# Patient Record
Sex: FEMALE | Race: BLACK OR AFRICAN AMERICAN | NOT HISPANIC OR LATINO
[De-identification: names, ages, dates, MRNs, and addresses within clinical notes are randomized per-mention and may not be internally consistent; named-entity substitution may affect disease eponyms.]

---

## 2017-01-23 ENCOUNTER — APPOINTMENT (OUTPATIENT)
Dept: OTOLARYNGOLOGY | Facility: CLINIC | Age: 42
End: 2017-01-23

## 2017-01-23 VITALS
HEIGHT: 55 IN | DIASTOLIC BLOOD PRESSURE: 79 MMHG | HEART RATE: 79 BPM | BODY MASS INDEX: 60.17 KG/M2 | WEIGHT: 260 LBS | SYSTOLIC BLOOD PRESSURE: 112 MMHG

## 2017-04-17 ENCOUNTER — APPOINTMENT (OUTPATIENT)
Dept: OTOLARYNGOLOGY | Facility: CLINIC | Age: 42
End: 2017-04-17

## 2017-04-17 VITALS
HEIGHT: 68 IN | BODY MASS INDEX: 38.95 KG/M2 | WEIGHT: 257 LBS | SYSTOLIC BLOOD PRESSURE: 116 MMHG | HEART RATE: 83 BPM | DIASTOLIC BLOOD PRESSURE: 83 MMHG

## 2017-04-17 DIAGNOSIS — J32.0 CHRONIC MAXILLARY SINUSITIS: ICD-10-CM

## 2017-04-18 ENCOUNTER — OUTPATIENT (OUTPATIENT)
Dept: OUTPATIENT SERVICES | Facility: HOSPITAL | Age: 42
LOS: 1 days | End: 2017-04-18
Payer: COMMERCIAL

## 2017-04-18 DIAGNOSIS — Z98.89 OTHER SPECIFIED POSTPROCEDURAL STATES: Chronic | ICD-10-CM

## 2017-04-18 PROCEDURE — 70486 CT MAXILLOFACIAL W/O DYE: CPT | Mod: 26

## 2017-04-18 PROCEDURE — 70486 CT MAXILLOFACIAL W/O DYE: CPT

## 2017-06-05 ENCOUNTER — APPOINTMENT (OUTPATIENT)
Dept: OTOLARYNGOLOGY | Facility: CLINIC | Age: 42
End: 2017-06-05

## 2017-06-26 ENCOUNTER — APPOINTMENT (OUTPATIENT)
Dept: OTOLARYNGOLOGY | Facility: CLINIC | Age: 42
End: 2017-06-26

## 2017-07-19 ENCOUNTER — APPOINTMENT (OUTPATIENT)
Dept: OTOLARYNGOLOGY | Facility: CLINIC | Age: 42
End: 2017-07-19

## 2017-07-19 RX ORDER — MOMETASONE 50 UG/1
50 SPRAY, METERED NASAL
Qty: 17 | Refills: 0 | Status: ACTIVE | COMMUNITY
Start: 2016-11-08

## 2017-08-31 VITALS
SYSTOLIC BLOOD PRESSURE: 121 MMHG | DIASTOLIC BLOOD PRESSURE: 67 MMHG | HEIGHT: 68 IN | RESPIRATION RATE: 16 BRPM | HEART RATE: 66 BPM | OXYGEN SATURATION: 100 % | WEIGHT: 260.59 LBS | TEMPERATURE: 97 F

## 2017-09-01 ENCOUNTER — INPATIENT (INPATIENT)
Facility: HOSPITAL | Age: 42
LOS: 0 days | Discharge: HOME | DRG: 132 | End: 2017-09-02
Attending: SPECIALIST | Admitting: SPECIALIST
Payer: COMMERCIAL

## 2017-09-01 ENCOUNTER — APPOINTMENT (OUTPATIENT)
Dept: OTOLARYNGOLOGY | Facility: HOSPITAL | Age: 42
End: 2017-09-01

## 2017-09-01 ENCOUNTER — RESULT REVIEW (OUTPATIENT)
Age: 42
End: 2017-09-01

## 2017-09-01 DIAGNOSIS — Z98.890 OTHER SPECIFIED POSTPROCEDURAL STATES: Chronic | ICD-10-CM

## 2017-09-01 DIAGNOSIS — Z98.89 OTHER SPECIFIED POSTPROCEDURAL STATES: Chronic | ICD-10-CM

## 2017-09-01 PROCEDURE — 68720 CREATE TEAR SAC DRAIN: CPT | Mod: RT

## 2017-09-01 PROCEDURE — 31225 REMOVAL OF UPPER JAW: CPT | Mod: RT

## 2017-09-01 RX ORDER — ACETAMINOPHEN 500 MG
650 TABLET ORAL EVERY 6 HOURS
Qty: 0 | Refills: 0 | Status: DISCONTINUED | OUTPATIENT
Start: 2017-09-01 | End: 2017-09-02

## 2017-09-01 RX ORDER — OXYCODONE AND ACETAMINOPHEN 5; 325 MG/1; MG/1
1 TABLET ORAL EVERY 4 HOURS
Qty: 0 | Refills: 0 | Status: DISCONTINUED | OUTPATIENT
Start: 2017-09-01 | End: 2017-09-02

## 2017-09-01 RX ORDER — CEFAZOLIN SODIUM 1 G
1000 VIAL (EA) INJECTION EVERY 8 HOURS
Qty: 0 | Refills: 0 | Status: DISCONTINUED | OUTPATIENT
Start: 2017-09-01 | End: 2017-09-02

## 2017-09-01 RX ORDER — ONDANSETRON 8 MG/1
4 TABLET, FILM COATED ORAL EVERY 4 HOURS
Qty: 0 | Refills: 0 | Status: DISCONTINUED | OUTPATIENT
Start: 2017-09-01 | End: 2017-09-02

## 2017-09-01 RX ORDER — SODIUM CHLORIDE 9 MG/ML
1000 INJECTION, SOLUTION INTRAVENOUS
Qty: 0 | Refills: 0 | Status: DISCONTINUED | OUTPATIENT
Start: 2017-09-01 | End: 2017-09-02

## 2017-09-01 RX ORDER — MORPHINE SULFATE 50 MG/1
4 CAPSULE, EXTENDED RELEASE ORAL
Qty: 0 | Refills: 0 | Status: DISCONTINUED | OUTPATIENT
Start: 2017-09-01 | End: 2017-09-02

## 2017-09-01 RX ORDER — METOCLOPRAMIDE HCL 10 MG
5 TABLET ORAL EVERY 8 HOURS
Qty: 0 | Refills: 0 | Status: DISCONTINUED | OUTPATIENT
Start: 2017-09-01 | End: 2017-09-02

## 2017-09-01 RX ADMIN — SODIUM CHLORIDE 100 MILLILITER(S): 9 INJECTION, SOLUTION INTRAVENOUS at 13:34

## 2017-09-01 RX ADMIN — Medication 100 MILLIGRAM(S): at 21:47

## 2017-09-01 NOTE — H&P ADULT - HISTORY OF PRESENT ILLNESS
Kelley Talamantes is a 41  year old female with a history of inverted papilloma s/p combined transnasal endoscopic and sublabial approach to right medial maxillectomy for inverted papilloma in 2016. Pt presented with obvious recurrent inverted papilloma in 4/2017.   On exam, there was evidence of obvious recurrent inverted papilloma on the anterior aspect of the right lateral nasal wall. There was no evidence of acute infection. The maxillary sinus ostium is open. On the left side, there is mild congestion with no infection or polyps. The rest of the complete and comprehensive examination of the head, neck, mouth, ears, and throat was unremarkable. There is no evidence of epiphora.   A repeat CT sinus from 4/2017 showed recurrence of inverted papilloma along the anterior maxillary antrum with residual central osseous strut. The base of this strut is close to the infraorbital canal. There is no orbital invasion. The patient underwent removal of the recurrent mass via a combined endoscopic and sublabial revision medial maxillectomy.

## 2017-09-01 NOTE — BRIEF OPERATIVE NOTE - PROCEDURE
Medial maxillectomy  09/01/2017  Right medial maxillectomy, endoscopic and sublabial approach  Active  [id number]

## 2017-09-01 NOTE — H&P ADULT - ASSESSMENT
41F s/p medial maxillectomy, endoscopic and sublabial approach. Doing well post-op.   []Will plan to d/c packing tomorrow and d/c home after observation.   []Will be on abx while inpatient.

## 2017-09-02 ENCOUNTER — TRANSCRIPTION ENCOUNTER (OUTPATIENT)
Age: 42
End: 2017-09-02

## 2017-09-02 VITALS
HEART RATE: 74 BPM | RESPIRATION RATE: 16 BRPM | TEMPERATURE: 98 F | DIASTOLIC BLOOD PRESSURE: 72 MMHG | OXYGEN SATURATION: 98 % | SYSTOLIC BLOOD PRESSURE: 104 MMHG

## 2017-09-02 LAB
GRAM STN FLD: SIGNIFICANT CHANGE UP
SPECIMEN SOURCE: SIGNIFICANT CHANGE UP

## 2017-09-02 RX ORDER — SODIUM CHLORIDE 0.65 %
1 AEROSOL, SPRAY (ML) NASAL
Qty: 5 | Refills: 0
Start: 2017-09-02 | End: 2017-09-09

## 2017-09-02 RX ORDER — ACETAMINOPHEN WITH CODEINE 300MG-30MG
1 TABLET ORAL EVERY 4 HOURS
Qty: 0 | Refills: 0 | Status: DISCONTINUED | OUTPATIENT
Start: 2017-09-02 | End: 2017-09-02

## 2017-09-02 RX ORDER — PREDNISOLONE SODIUM PHOSPHATE 1 %
1 DROPS OPHTHALMIC (EYE)
Qty: 1 | Refills: 0 | OUTPATIENT
Start: 2017-09-02 | End: 2017-09-07

## 2017-09-02 RX ORDER — ACETAMINOPHEN WITH CODEINE 300MG-30MG
1 TABLET ORAL
Qty: 0 | Refills: 0 | DISCHARGE
Start: 2017-09-02

## 2017-09-02 RX ORDER — PREDNISOLONE SODIUM PHOSPHATE 1 %
2 DROPS OPHTHALMIC (EYE)
Qty: 1 | Refills: 0
Start: 2017-09-02 | End: 2017-09-07

## 2017-09-02 RX ORDER — ACETAMINOPHEN WITH CODEINE 300MG-30MG
1 TABLET ORAL
Qty: 18 | Refills: 0 | OUTPATIENT
Start: 2017-09-02 | End: 2017-09-05

## 2017-09-02 RX ADMIN — Medication 100 MILLIGRAM(S): at 06:12

## 2017-09-02 NOTE — DISCHARGE NOTE ADULT - HOSPITAL COURSE
41F s/p medial maxillectomy, endoscopic and sublabial approach. Doing well post-op.     9/2 - well overnight, tolerating PO, pain controlled. packing to be removed today      Vital Signs Last 24 Hrs  T(C): 36.4 (02 Sep 2017 12:33), Max: 36.8 (02 Sep 2017 00:08)  T(F): 97.6 (02 Sep 2017 12:33), Max: 98.3 (02 Sep 2017 06:02)  HR: 74 (02 Sep 2017 12:33) (74 - 90)  BP: 104/72 (02 Sep 2017 12:33) (104/72 - 139/76)  BP(mean): --  RR: 16 (02 Sep 2017 12:33) (16 - 18)  SpO2: 98% (02 Sep 2017 12:33) (97% - 100%)    Physical Exam:  Physical Exam: NAD EOMI Shahid catheter and merocel in right naris. Minimal dark red bloody drainage.  Slightly decreased sensation over V2 distribution on the right side.  Shahid and merocel packing removed with minimal bleeding after removal. 	    Discharge: home    Patient observed for 4 hours after removal of packing. no bleeding.     Plan discussed with Dr Johnson.

## 2017-09-02 NOTE — DISCHARGE NOTE ADULT - MEDICATION SUMMARY - MEDICATIONS TO TAKE
I will START or STAY ON the medications listed below when I get home from the hospital:    acetaminophen-codeine 300 mg-30 mg oral tablet  -- 1 tab(s) by mouth every 4 hours, As needed, Mild Pain (1 - 3)  -- Indication: For pain    Deep Sea Nasal 0.65% nasal spray  -- 1 spray(s) into nose 3 times a day  -- For the nose.    -- Indication: For nasal surgery    fluticasone 50 mcg/inh nasal spray  -- 1 spray(s) into nose 2 times a day  -- Indication: For H/O nasal polypectomy    Pred Forte 1% ophthalmic suspension  -- 2 drop(s) to each affected eye 2 times a day  -- For the eye.  Shake well before use.    -- Indication: For epiphora

## 2017-09-02 NOTE — DISCHARGE NOTE ADULT - CARE PROVIDER_API CALL
Radha Villanueva), [facility]; Otolaryngology  [address]  [address] [place] [address]  Phone: [phone]  Fax: [phone]

## 2017-09-02 NOTE — DISCHARGE NOTE ADULT - PATIENT PORTAL LINK FT
“You can access the [employer] Patient Portal, offered by [facility], by registering with the following website: [email or web address]”

## 2017-09-02 NOTE — DISCHARGE NOTE ADULT - PLAN OF CARE
rehabilitation - light duties for the next 2 weeks. Do not exert yourself, do not lift anything heavier than a gallon of milk  - please take tylenol or T3 as needed for pain, avoid aspirin or NSAIDs  - please start using nasal saline spray  - please rinse your mouth after every meal or snack and wash your lip incision with salt water  - use eye drops for 5 days  - contact Dr Johnson's office for a follow up appt  - blood tinged mucous and old blood (brownish) may continue to drip from your nose for the next 3-4 days  - return to ED with any concerns of persistent bleeding, uncontrolled pain, fever >102

## 2017-09-02 NOTE — DISCHARGE NOTE ADULT - CARE PLAN
Principal Discharge DX:	H/O nasal polypectomy  Goal:	rehabilitation  Instructions for follow-up, activity and diet:	- light duties for the next 2 weeks. Do not exert yourself, do not lift anything heavier than a gallon of milk  - please take tylenol or T3 as needed for pain, avoid aspirin or NSAIDs  - please start using nasal saline spray  - please rinse your mouth after every meal or snack and wash your lip incision with salt water  - use eye drops for 5 days  - contact Dr Johnson's office for a follow up appt  - blood tinged mucous and old blood (brownish) may continue to drip from your nose for the next 3-4 days  - return to ED with any concerns of persistent bleeding, uncontrolled pain, fever >102

## 2017-09-03 LAB
-  AMPICILLIN/SULBACTAM: SIGNIFICANT CHANGE UP
-  AMPICILLIN: SIGNIFICANT CHANGE UP
-  CEFAZOLIN: SIGNIFICANT CHANGE UP
-  CEFTRIAXONE: SIGNIFICANT CHANGE UP
-  CIPROFLOXACIN: SIGNIFICANT CHANGE UP
-  GENTAMICIN: SIGNIFICANT CHANGE UP
-  PIPERACILLIN/TAZOBACTAM: SIGNIFICANT CHANGE UP
-  TOBRAMYCIN: SIGNIFICANT CHANGE UP
-  TRIMETHOPRIM/SULFAMETHOXAZOLE: SIGNIFICANT CHANGE UP
METHOD TYPE: SIGNIFICANT CHANGE UP

## 2017-09-05 DIAGNOSIS — Z79.84 LONG TERM (CURRENT) USE OF ORAL HYPOGLYCEMIC DRUGS: ICD-10-CM

## 2017-09-05 DIAGNOSIS — N18.9 CHRONIC KIDNEY DISEASE, UNSPECIFIED: ICD-10-CM

## 2017-09-05 DIAGNOSIS — E11.9 TYPE 2 DIABETES MELLITUS WITHOUT COMPLICATIONS: ICD-10-CM

## 2017-09-05 DIAGNOSIS — D14.0 BENIGN NEOPLASM OF MIDDLE EAR, NASAL CAVITY AND ACCESSORY SINUSES: ICD-10-CM

## 2017-09-05 DIAGNOSIS — E66.01 MORBID (SEVERE) OBESITY DUE TO EXCESS CALORIES: ICD-10-CM

## 2017-09-05 DIAGNOSIS — I12.9 HYPERTENSIVE CHRONIC KIDNEY DISEASE WITH STAGE 1 THROUGH STAGE 4 CHRONIC KIDNEY DISEASE, OR UNSPECIFIED CHRONIC KIDNEY DISEASE: ICD-10-CM

## 2017-09-05 LAB
CULTURE RESULTS: SIGNIFICANT CHANGE UP
ORGANISM # SPEC MICROSCOPIC CNT: SIGNIFICANT CHANGE UP
ORGANISM # SPEC MICROSCOPIC CNT: SIGNIFICANT CHANGE UP
SPECIMEN SOURCE: SIGNIFICANT CHANGE UP

## 2017-09-05 PROCEDURE — 87075 CULTR BACTERIA EXCEPT BLOOD: CPT

## 2017-09-05 PROCEDURE — 87186 SC STD MICRODIL/AGAR DIL: CPT

## 2017-09-05 PROCEDURE — 87070 CULTURE OTHR SPECIMN AEROBIC: CPT

## 2017-09-05 PROCEDURE — 88311 DECALCIFY TISSUE: CPT

## 2017-09-05 PROCEDURE — 88305 TISSUE EXAM BY PATHOLOGIST: CPT

## 2017-09-07 LAB — SURGICAL PATHOLOGY STUDY: SIGNIFICANT CHANGE UP

## 2017-09-08 DIAGNOSIS — D31.51: ICD-10-CM

## 2017-09-18 ENCOUNTER — APPOINTMENT (OUTPATIENT)
Dept: OTOLARYNGOLOGY | Facility: CLINIC | Age: 42
End: 2017-09-18
Payer: COMMERCIAL

## 2017-09-18 VITALS
DIASTOLIC BLOOD PRESSURE: 74 MMHG | HEART RATE: 87 BPM | BODY MASS INDEX: 39.84 KG/M2 | SYSTOLIC BLOOD PRESSURE: 109 MMHG | WEIGHT: 262 LBS

## 2017-09-18 PROCEDURE — 31237 NSL/SINS NDSC SURG BX POLYPC: CPT | Mod: 79,RT

## 2018-01-29 ENCOUNTER — APPOINTMENT (OUTPATIENT)
Dept: OTOLARYNGOLOGY | Facility: CLINIC | Age: 43
End: 2018-01-29
Payer: COMMERCIAL

## 2018-01-29 VITALS
DIASTOLIC BLOOD PRESSURE: 87 MMHG | HEIGHT: 68 IN | HEART RATE: 75 BPM | BODY MASS INDEX: 40.77 KG/M2 | SYSTOLIC BLOOD PRESSURE: 113 MMHG | WEIGHT: 269 LBS

## 2018-01-29 PROCEDURE — 99214 OFFICE O/P EST MOD 30 MIN: CPT | Mod: 25

## 2018-01-29 PROCEDURE — 31231 NASAL ENDOSCOPY DX: CPT

## 2018-05-30 ENCOUNTER — APPOINTMENT (OUTPATIENT)
Dept: OTOLARYNGOLOGY | Facility: CLINIC | Age: 43
End: 2018-05-30
Payer: COMMERCIAL

## 2018-05-30 VITALS
WEIGHT: 265 LBS | SYSTOLIC BLOOD PRESSURE: 111 MMHG | DIASTOLIC BLOOD PRESSURE: 75 MMHG | HEIGHT: 68 IN | HEART RATE: 80 BPM | BODY MASS INDEX: 40.16 KG/M2

## 2018-05-30 PROCEDURE — 99214 OFFICE O/P EST MOD 30 MIN: CPT | Mod: 25

## 2018-05-30 PROCEDURE — 31231 NASAL ENDOSCOPY DX: CPT

## 2018-05-30 RX ORDER — MONTELUKAST 10 MG/1
10 TABLET, FILM COATED ORAL
Qty: 30 | Refills: 1 | Status: ACTIVE | COMMUNITY
Start: 2018-05-30 | End: 1900-01-01

## 2018-06-12 ENCOUNTER — APPOINTMENT (OUTPATIENT)
Dept: MRI IMAGING | Facility: HOSPITAL | Age: 43
End: 2018-06-12

## 2018-09-24 ENCOUNTER — APPOINTMENT (OUTPATIENT)
Dept: OTOLARYNGOLOGY | Facility: CLINIC | Age: 43
End: 2018-09-24

## 2018-11-05 ENCOUNTER — APPOINTMENT (OUTPATIENT)
Dept: OTOLARYNGOLOGY | Facility: CLINIC | Age: 43
End: 2018-11-05
Payer: COMMERCIAL

## 2018-11-05 ENCOUNTER — TRANSCRIPTION ENCOUNTER (OUTPATIENT)
Age: 43
End: 2018-11-05

## 2018-11-05 VITALS
SYSTOLIC BLOOD PRESSURE: 118 MMHG | DIASTOLIC BLOOD PRESSURE: 87 MMHG | HEART RATE: 80 BPM | WEIGHT: 266 LBS | HEIGHT: 68 IN | BODY MASS INDEX: 40.32 KG/M2

## 2018-11-05 DIAGNOSIS — D37.09 NEOPLASM OF UNCERTAIN BEHAVIOR OF OTHER SPECIFIED SITES OF THE ORAL CAVITY: ICD-10-CM

## 2018-11-05 DIAGNOSIS — D48.7 NEOPLASM OF UNCERTAIN BEHAVIOR OF OTHER SPECIFIED SITES: ICD-10-CM

## 2018-11-05 PROCEDURE — 31231 NASAL ENDOSCOPY DX: CPT

## 2018-11-05 PROCEDURE — 99214 OFFICE O/P EST MOD 30 MIN: CPT | Mod: 25

## 2019-01-11 ENCOUNTER — RESULT REVIEW (OUTPATIENT)
Age: 44
End: 2019-01-11

## 2019-01-11 ENCOUNTER — INPATIENT (INPATIENT)
Facility: HOSPITAL | Age: 44
LOS: 0 days | Discharge: HOME | DRG: 988 | End: 2019-01-12
Attending: SPECIALIST | Admitting: SPECIALIST
Payer: COMMERCIAL

## 2019-01-11 ENCOUNTER — APPOINTMENT (OUTPATIENT)
Dept: OTOLARYNGOLOGY | Facility: HOSPITAL | Age: 44
End: 2019-01-11

## 2019-01-11 VITALS
TEMPERATURE: 97 F | HEART RATE: 76 BPM | OXYGEN SATURATION: 99 % | RESPIRATION RATE: 16 BRPM | DIASTOLIC BLOOD PRESSURE: 71 MMHG | HEIGHT: 68 IN | WEIGHT: 273.59 LBS | SYSTOLIC BLOOD PRESSURE: 122 MMHG

## 2019-01-11 DIAGNOSIS — Z98.890 OTHER SPECIFIED POSTPROCEDURAL STATES: Chronic | ICD-10-CM

## 2019-01-11 DIAGNOSIS — Z98.89 OTHER SPECIFIED POSTPROCEDURAL STATES: Chronic | ICD-10-CM

## 2019-01-11 PROCEDURE — 21558 RESECT NECK TUMOR 5 CM/>: CPT

## 2019-01-11 RX ORDER — OXYCODONE AND ACETAMINOPHEN 5; 325 MG/1; MG/1
1 TABLET ORAL EVERY 4 HOURS
Qty: 0 | Refills: 0 | Status: DISCONTINUED | OUTPATIENT
Start: 2019-01-11 | End: 2019-01-12

## 2019-01-11 RX ORDER — OXYCODONE AND ACETAMINOPHEN 5; 325 MG/1; MG/1
2 TABLET ORAL EVERY 4 HOURS
Qty: 0 | Refills: 0 | Status: DISCONTINUED | OUTPATIENT
Start: 2019-01-11 | End: 2019-01-12

## 2019-01-11 RX ORDER — ONDANSETRON 8 MG/1
4 TABLET, FILM COATED ORAL EVERY 6 HOURS
Qty: 0 | Refills: 0 | Status: DISCONTINUED | OUTPATIENT
Start: 2019-01-11 | End: 2019-01-12

## 2019-01-11 RX ORDER — ACETAMINOPHEN 500 MG
650 TABLET ORAL EVERY 6 HOURS
Qty: 0 | Refills: 0 | Status: DISCONTINUED | OUTPATIENT
Start: 2019-01-11 | End: 2019-01-12

## 2019-01-11 RX ORDER — DEXAMETHASONE 0.5 MG/5ML
8 ELIXIR ORAL ONCE
Qty: 0 | Refills: 0 | Status: DISCONTINUED | OUTPATIENT
Start: 2019-01-11 | End: 2019-01-11

## 2019-01-11 RX ORDER — DEXAMETHASONE 0.5 MG/5ML
8 ELIXIR ORAL ONCE
Qty: 0 | Refills: 0 | Status: COMPLETED | OUTPATIENT
Start: 2019-01-11 | End: 2019-01-12

## 2019-01-11 NOTE — BRIEF OPERATIVE NOTE - OPERATION/FINDINGS
Lipoma in left lateral floor of mouth descending into cervical neck - excised with preservation of left submandibular gland duct and left sublingual gland.

## 2019-01-11 NOTE — H&P ADULT - ASSESSMENT
43F s/p resection of left FOM and cervical neck mass on 1/11/19.   - One dose of decadron this PM   - CLD   - pain control   - vitals per routine   - DVT ppx with SCDs     Attending agrees with above 43F s/p resection of left FOM and cervical neck mass on 1/11/19.   - One dose of decadron this PM   - NPO tonight --> CLD tomorrow   - pain control   - vitals per routine   - DVT ppx with SCDs     Attending agrees with above

## 2019-01-11 NOTE — PROGRESS NOTE ADULT - SUBJECTIVE AND OBJECTIVE BOX
Post op check note:     Procedure: resection of left FOM and cervical neck mass.   Procedure date: 1/11/19     INTERVAL HPI/OVERNIGHT EVENTS:  Patient doing well post-operatively.  Pt denies pain, dysphagia and respiratory distress.    MEDICATIONS  (STANDING):  dexamethasone  Injectable 8 milliGRAM(s) IV Push once    MEDICATIONS  (PRN):  acetaminophen    Suspension .. 650 milliGRAM(s) Oral every 6 hours PRN Mild Pain (1 - 3)  ondansetron Injectable 4 milliGRAM(s) IV Push every 6 hours PRN Nausea and/or Vomiting  oxyCODONE    5 mG/acetaminophen 325 mG 1 Tablet(s) Oral every 4 hours PRN Moderate Pain (4 - 6)  oxyCODONE    5 mG/acetaminophen 325 mG 2 Tablet(s) Oral every 4 hours PRN Severe Pain (7 - 10)      Allergies    No Known Allergies    Intolerances        REVIEW OF SYSTEMS: all systems reviewed and found to be negative.     Vital Signs Last 24 Hrs  T(C): 36.2 (11 Jan 2019 11:59), Max: 36.2 (11 Jan 2019 11:59)  T(F): 97.1 (11 Jan 2019 11:59), Max: 97.1 (11 Jan 2019 11:59)  HR: 76 (11 Jan 2019 11:59) (76 - 76)  BP: 122/71 (11 Jan 2019 11:59) (122/71 - 122/71)  BP(mean): --  RR: 16 (11 Jan 2019 11:59) (16 - 16)  SpO2: 99% (11 Jan 2019 11:59) (99% - 99%)    Physical Exam   Physical Exam: Gen: NAD, A+OX 3   Face: NCAT, CN2-12 grossly intact bilaterally, HB1/6   Nose: clear to anterior rhinoscopy   OC/OP: MMM.  Left tongue base incision, C/I with vicryl sutures in place, OP clear   Neck: soft and flat  Resp: breathing unlabored on RA.	    LABS:                RADIOLOGY & ADDITIONAL TESTS:    A/P:  43F s/p resection of left FOM and cervical neck mass on 1/11/19.   - Admit to regional bed  - One dose of decadron this PM   - CLD   - pain control   - vitals per routine     D/W Attending whom agrees with above.     PPX with IS, SCDs Post op check note:     Procedure: resection of left FOM and cervical neck mass.   Procedure date: 1/11/19     INTERVAL HPI/OVERNIGHT EVENTS:  Patient doing well post-operatively.  Pt denies pain, dysphagia and respiratory distress.    MEDICATIONS  (STANDING):  dexamethasone  Injectable 8 milliGRAM(s) IV Push once    MEDICATIONS  (PRN):  acetaminophen    Suspension .. 650 milliGRAM(s) Oral every 6 hours PRN Mild Pain (1 - 3)  ondansetron Injectable 4 milliGRAM(s) IV Push every 6 hours PRN Nausea and/or Vomiting  oxyCODONE    5 mG/acetaminophen 325 mG 1 Tablet(s) Oral every 4 hours PRN Moderate Pain (4 - 6)  oxyCODONE    5 mG/acetaminophen 325 mG 2 Tablet(s) Oral every 4 hours PRN Severe Pain (7 - 10)      Allergies    No Known Allergies    Intolerances        REVIEW OF SYSTEMS: all systems reviewed and found to be negative.     Vital Signs Last 24 Hrs  T(C): 36.2 (11 Jan 2019 11:59), Max: 36.2 (11 Jan 2019 11:59)  T(F): 97.1 (11 Jan 2019 11:59), Max: 97.1 (11 Jan 2019 11:59)  HR: 76 (11 Jan 2019 11:59) (76 - 76)  BP: 122/71 (11 Jan 2019 11:59) (122/71 - 122/71)  BP(mean): --  RR: 16 (11 Jan 2019 11:59) (16 - 16)  SpO2: 99% (11 Jan 2019 11:59) (99% - 99%)    Physical Exam   Physical Exam: Gen: NAD, A+OX 3   Face: NCAT, CN2-12 grossly intact bilaterally, HB1/6   Nose: clear to anterior rhinoscopy   OC/OP: MMM.  Left tongue base incision, C/I with vicryl sutures in place, OP clear   Neck: soft and flat  Resp: breathing unlabored on RA.	    LABS:                RADIOLOGY & ADDITIONAL TESTS:    A/P:  43F s/p resection of left FOM and cervical neck mass on 1/11/19.   - Admit to regional bed  - One dose of decadron this PM   - NPO tonight -> CLD tomorrow   - pain control   - vitals per routine     D/W Attending whom agrees with above.     PPX with IS, SCDs

## 2019-01-11 NOTE — H&P ADULT - HISTORY OF PRESENT ILLNESS
43F with PMhx of maxillary IP presenting for elective resection of left FOM / cervical neck lipoma on 1/11/19.  Pt tolerated procedure well and was admitted for monitoring of FOM and cervical neck as well as diet advancement.

## 2019-01-11 NOTE — H&P ADULT - NSHPPHYSICALEXAM_GEN_ALL_CORE
Gen: NAD, A+OX 3   Face: NCAT, CN2-12 grossly intact bilaterally, HB1/6   Nose: clear to anterior rhinoscopy   OC/OP: MMM.  Left tongue base incision, C/I with vicryl sutures in place, OP clear   Neck: soft and flat   Resp: breathing unlabored on RA.

## 2019-01-11 NOTE — PROGRESS NOTE ADULT - ASSESSMENT
A/P:  43F s/p resection of left FOM and cervical neck mass on 1/11/19.   - Admit to regional bed  - One dose of decadron this PM   - CLD   - pain control   - vitals per routine     D/W Attending whom agrees with above.     PPX with IS, SCDs

## 2019-01-11 NOTE — BRIEF OPERATIVE NOTE - PROCEDURE
<<-----Click on this checkbox to enter Procedure Excision of mass of floor of mouth  01/11/2019    Active  RTABTABAI

## 2019-01-12 ENCOUNTER — TRANSCRIPTION ENCOUNTER (OUTPATIENT)
Age: 44
End: 2019-01-12

## 2019-01-12 VITALS
TEMPERATURE: 98 F | SYSTOLIC BLOOD PRESSURE: 149 MMHG | DIASTOLIC BLOOD PRESSURE: 91 MMHG | HEART RATE: 80 BPM | OXYGEN SATURATION: 97 % | RESPIRATION RATE: 17 BRPM

## 2019-01-12 RX ORDER — ACETAMINOPHEN 500 MG
20 TABLET ORAL
Qty: 0 | Refills: 0 | DISCHARGE
Start: 2019-01-12

## 2019-01-12 RX ORDER — ACETAMINOPHEN 500 MG
20 TABLET ORAL
Qty: 200 | Refills: 0 | OUTPATIENT
Start: 2019-01-12

## 2019-01-12 RX ADMIN — OXYCODONE AND ACETAMINOPHEN 1 TABLET(S): 5; 325 TABLET ORAL at 05:34

## 2019-01-12 RX ADMIN — Medication 101.6 MILLIGRAM(S): at 00:12

## 2019-01-12 RX ADMIN — OXYCODONE AND ACETAMINOPHEN 1 TABLET(S): 5; 325 TABLET ORAL at 06:30

## 2019-01-12 NOTE — DISCHARGE NOTE ADULT - PLAN OF CARE
s/p excision of left floor of mouth  / cervical neck mass •	Report any fever, chills, difficulty breathing, difficulty swallowing, change in your voice, bleeding or purulent drainage from your incision site, or any significant swelling to the doctor immediately.  •	Diet: Clear liquid diet (see diet instructions) until otherwise instructed by your doctor.  •	Activity: No heavy lifting; do not lift anything heavier than a gallon of milk until after you follow-up with your doctor; no strenuous activity such as running or biking.  •	Shower/Bathing: You may shower and bathe as usual.  •	Wound care: Keep incision clean by rinsing your mouth with tap water after you eat and before bed.  •	Take all medications as prescribed.  •	Continue all of your normal home medications unless told otherwise.  •	You may take Tylenol for mild pain.

## 2019-01-12 NOTE — DISCHARGE NOTE ADULT - PATIENT PORTAL LINK FT
You can access the [facility] Patient Portal, offered by [facility], by registering with the following website: [email or web address]

## 2019-01-12 NOTE — DISCHARGE NOTE ADULT - CARE PROVIDER_API CALL
Radha Villanueva), [facility]; Otolaryngology  [address]  [address] [place] [address]  Phone: [phone]  Fax: [phone]

## 2019-01-12 NOTE — DISCHARGE NOTE ADULT - HOSPITAL COURSE
43F with PMhx of maxillary IP presenting for elective resection of left FOM / cervical neck lipoma on 1/11/19.  Pt tolerated procedure well and was admitted for monitoring of FOM and cervical neck as well as diet advancement.  She was doing well on POD0. She was seen and examined on POD1 with pain well controlled. She tolerated clear liquids and was deemed stable for discharge home.

## 2019-01-12 NOTE — DISCHARGE NOTE ADULT - INSTRUCTIONS
Continue a clear liquid diet until instructed by your doctor. Foods and liquids allowed on the clear liquid diet include popsicles, clear juice without pulp, plain gelatin, ice chips, water, sweetened tea or coffee (no creamer), clear broths, carbonated beverages, and flavored water. It is important you do not consume any liquids with red dye or coloring as it may give misleading test or procedure results.

## 2019-01-12 NOTE — DISCHARGE NOTE ADULT - CARE PLAN
Principal Discharge DX:	Oral mass  Goal:	s/p excision of left floor of mouth  / cervical neck mass  Assessment and plan of treatment:	•	Report any fever, chills, difficulty breathing, difficulty swallowing, change in your voice, bleeding or purulent drainage from your incision site, or any significant swelling to the doctor immediately.  •	Diet: Clear liquid diet (see diet instructions) until otherwise instructed by your doctor.  •	Activity: No heavy lifting; do not lift anything heavier than a gallon of milk until after you follow-up with your doctor; no strenuous activity such as running or biking.  •	Shower/Bathing: You may shower and bathe as usual.  •	Wound care: Keep incision clean by rinsing your mouth with tap water after you eat and before bed.  •	Take all medications as prescribed.  •	Continue all of your normal home medications unless told otherwise.  •	You may take Tylenol for mild pain.

## 2019-01-12 NOTE — DISCHARGE NOTE ADULT - NS AS ACTIVITY OBS
Do not drive or operate machinery/Walking-Indoors allowed/Bathing allowed/Showering allowed/Walking-Outdoors allowed/No Heavy lifting/straining/Stairs allowed

## 2019-01-12 NOTE — DISCHARGE NOTE ADULT - MEDICATION SUMMARY - MEDICATIONS TO TAKE
I will START or STAY ON the medications listed below when I get home from the hospital:    acetaminophen-codeine 300 mg-30 mg oral tablet  -- 1 tab(s) by mouth every 4 hours, As needed, Mild Pain (1 - 3)  -- Indication: For TUMOR OF MOUTH    acetaminophen 160 mg/5 mL oral suspension  -- 20 milliliter(s) by mouth every 6 hours, As Needed - 3)  -- Indication: For TUMOR OF MOUTH    fluticasone 50 mcg/inh nasal spray  -- 1 spray(s) into nose 2 times a day  -- Indication: For TUMOR OF MOUTH    Deep Sea Nasal 0.65% nasal spray  -- 1 spray(s) into nose 3 times a day  -- For the nose.    -- Indication: For TUMOR OF MOUTH    Pred Forte 1% ophthalmic suspension  -- 2 drop(s) to each affected eye 2 times a day  -- For the eye.  Shake well before use.    -- Indication: For TUMOR OF MOUTH

## 2019-01-14 PROCEDURE — 88305 TISSUE EXAM BY PATHOLOGIST: CPT

## 2019-01-15 DIAGNOSIS — D17.0 BENIGN LIPOMATOUS NEOPLASM OF SKIN AND SUBCUTANEOUS TISSUE OF HEAD, FACE AND NECK: ICD-10-CM

## 2019-01-15 DIAGNOSIS — E66.9 OBESITY, UNSPECIFIED: ICD-10-CM

## 2019-01-15 LAB — SURGICAL PATHOLOGY STUDY: SIGNIFICANT CHANGE UP

## 2019-01-18 ENCOUNTER — RX RENEWAL (OUTPATIENT)
Age: 44
End: 2019-01-18

## 2019-01-18 RX ORDER — ACETAMINOPHEN AND CODEINE 300; 30 MG/1; MG/1
300-30 TABLET ORAL
Qty: 30 | Refills: 0 | Status: ACTIVE | COMMUNITY
Start: 2019-01-18 | End: 1900-01-01

## 2019-01-28 ENCOUNTER — APPOINTMENT (OUTPATIENT)
Dept: OTOLARYNGOLOGY | Facility: CLINIC | Age: 44
End: 2019-01-28
Payer: COMMERCIAL

## 2019-01-28 VITALS
SYSTOLIC BLOOD PRESSURE: 118 MMHG | WEIGHT: 268 LBS | DIASTOLIC BLOOD PRESSURE: 81 MMHG | HEIGHT: 68 IN | BODY MASS INDEX: 40.62 KG/M2

## 2019-01-28 DIAGNOSIS — R13.10 DYSPHAGIA, UNSPECIFIED: ICD-10-CM

## 2019-01-28 DIAGNOSIS — R49.0 DYSPHONIA: ICD-10-CM

## 2019-01-28 PROCEDURE — 31575 DIAGNOSTIC LARYNGOSCOPY: CPT | Mod: 79

## 2019-01-29 PROBLEM — R49.0 DYSPHONIA: Status: ACTIVE | Noted: 2019-01-29

## 2019-01-29 PROBLEM — R13.10 DYSPHAGIA: Status: ACTIVE | Noted: 2019-01-29

## 2019-08-05 ENCOUNTER — APPOINTMENT (OUTPATIENT)
Dept: OTOLARYNGOLOGY | Facility: CLINIC | Age: 44
End: 2019-08-05

## 2019-11-04 ENCOUNTER — APPOINTMENT (OUTPATIENT)
Dept: OTOLARYNGOLOGY | Facility: CLINIC | Age: 44
End: 2019-11-04
Payer: COMMERCIAL

## 2019-11-04 PROCEDURE — 31231 NASAL ENDOSCOPY DX: CPT

## 2019-11-04 PROCEDURE — 99214 OFFICE O/P EST MOD 30 MIN: CPT | Mod: 25

## 2020-06-22 ENCOUNTER — APPOINTMENT (OUTPATIENT)
Dept: OTOLARYNGOLOGY | Facility: CLINIC | Age: 45
End: 2020-06-22

## 2020-12-10 NOTE — PRE-OP CHECKLIST - SPO2 (%)
100 Bactrim Pregnancy And Lactation Text: This medication is Pregnancy Category D and is known to cause fetal risk.  It is also excreted in breast milk.

## 2021-04-12 ENCOUNTER — APPOINTMENT (OUTPATIENT)
Dept: OTOLARYNGOLOGY | Facility: CLINIC | Age: 46
End: 2021-04-12
Payer: COMMERCIAL

## 2021-04-12 PROCEDURE — 31231 NASAL ENDOSCOPY DX: CPT

## 2021-04-12 PROCEDURE — 99214 OFFICE O/P EST MOD 30 MIN: CPT | Mod: 25

## 2021-04-12 PROCEDURE — 99072 ADDL SUPL MATRL&STAF TM PHE: CPT

## 2021-04-16 ENCOUNTER — APPOINTMENT (OUTPATIENT)
Dept: CT IMAGING | Facility: HOSPITAL | Age: 46
End: 2021-04-16

## 2021-05-07 NOTE — ASU PATIENT PROFILE, ADULT - NS SC CAGE ALCOHOL GUILTY ABOUT
Capillary Blood Specimen Collection  Capillary blood collection performed in Right ring finger by Su Burnett MA. Patient tolerated the procedure well without complications.   05/07/21   CHEN Carrera MD      
no

## 2021-05-10 ENCOUNTER — APPOINTMENT (OUTPATIENT)
Dept: OTOLARYNGOLOGY | Facility: CLINIC | Age: 46
End: 2021-05-10
Payer: COMMERCIAL

## 2021-05-10 PROCEDURE — 99072 ADDL SUPL MATRL&STAF TM PHE: CPT

## 2021-05-10 PROCEDURE — 31231 NASAL ENDOSCOPY DX: CPT

## 2021-05-10 PROCEDURE — 99214 OFFICE O/P EST MOD 30 MIN: CPT | Mod: 25

## 2021-06-08 ENCOUNTER — LABORATORY RESULT (OUTPATIENT)
Age: 46
End: 2021-06-08

## 2021-06-09 ENCOUNTER — TRANSCRIPTION ENCOUNTER (OUTPATIENT)
Age: 46
End: 2021-06-09

## 2021-06-10 ENCOUNTER — TRANSCRIPTION ENCOUNTER (OUTPATIENT)
Age: 46
End: 2021-06-10

## 2021-06-10 VITALS
DIASTOLIC BLOOD PRESSURE: 85 MMHG | WEIGHT: 268.3 LBS | TEMPERATURE: 98 F | OXYGEN SATURATION: 99 % | SYSTOLIC BLOOD PRESSURE: 143 MMHG | HEART RATE: 82 BPM | RESPIRATION RATE: 16 BRPM | HEIGHT: 68 IN

## 2021-06-11 ENCOUNTER — OUTPATIENT (OUTPATIENT)
Dept: OUTPATIENT SERVICES | Facility: HOSPITAL | Age: 46
LOS: 1 days | Discharge: HOME | End: 2021-06-11
Payer: COMMERCIAL

## 2021-06-11 ENCOUNTER — APPOINTMENT (OUTPATIENT)
Dept: OTOLARYNGOLOGY | Facility: HOSPITAL | Age: 46
End: 2021-06-11

## 2021-06-11 ENCOUNTER — RESULT REVIEW (OUTPATIENT)
Age: 46
End: 2021-06-11

## 2021-06-11 DIAGNOSIS — Z98.890 OTHER SPECIFIED POSTPROCEDURAL STATES: Chronic | ICD-10-CM

## 2021-06-11 DIAGNOSIS — Z98.89 OTHER SPECIFIED POSTPROCEDURAL STATES: Chronic | ICD-10-CM

## 2021-06-11 PROCEDURE — 31225 REMOVAL OF UPPER JAW: CPT | Mod: RT

## 2021-06-11 PROCEDURE — 88311 DECALCIFY TISSUE: CPT | Mod: 26

## 2021-06-11 PROCEDURE — 88305 TISSUE EXAM BY PATHOLOGIST: CPT | Mod: 26

## 2021-06-11 RX ORDER — OXYCODONE AND ACETAMINOPHEN 5; 325 MG/1; MG/1
2 TABLET ORAL EVERY 6 HOURS
Refills: 0 | Status: DISCONTINUED | OUTPATIENT
Start: 2021-06-11 | End: 2021-06-12

## 2021-06-11 RX ORDER — CEFAZOLIN SODIUM 1 G
1000 VIAL (EA) INJECTION EVERY 8 HOURS
Refills: 0 | Status: DISCONTINUED | OUTPATIENT
Start: 2021-06-11 | End: 2021-06-12

## 2021-06-11 RX ORDER — LANOLIN ALCOHOL/MO/W.PET/CERES
3 CREAM (GRAM) TOPICAL AT BEDTIME
Refills: 0 | Status: DISCONTINUED | OUTPATIENT
Start: 2021-06-11 | End: 2021-06-12

## 2021-06-11 RX ORDER — HYDROMORPHONE HYDROCHLORIDE 2 MG/ML
0.5 INJECTION INTRAMUSCULAR; INTRAVENOUS; SUBCUTANEOUS
Refills: 0 | Status: DISCONTINUED | OUTPATIENT
Start: 2021-06-11 | End: 2021-06-12

## 2021-06-11 RX ORDER — OXYCODONE AND ACETAMINOPHEN 5; 325 MG/1; MG/1
1 TABLET ORAL EVERY 6 HOURS
Refills: 0 | Status: DISCONTINUED | OUTPATIENT
Start: 2021-06-11 | End: 2021-06-12

## 2021-06-11 RX ORDER — ACETAMINOPHEN 500 MG
650 TABLET ORAL EVERY 6 HOURS
Refills: 0 | Status: DISCONTINUED | OUTPATIENT
Start: 2021-06-11 | End: 2021-06-12

## 2021-06-11 RX ORDER — SODIUM CHLORIDE 9 MG/ML
1000 INJECTION, SOLUTION INTRAVENOUS
Refills: 0 | Status: DISCONTINUED | OUTPATIENT
Start: 2021-06-11 | End: 2021-06-12

## 2021-06-11 RX ORDER — ONDANSETRON 8 MG/1
4 TABLET, FILM COATED ORAL EVERY 6 HOURS
Refills: 0 | Status: DISCONTINUED | OUTPATIENT
Start: 2021-06-11 | End: 2021-06-12

## 2021-06-11 RX ADMIN — Medication 100 MILLIGRAM(S): at 21:58

## 2021-06-11 RX ADMIN — SODIUM CHLORIDE 75 MILLILITER(S): 9 INJECTION, SOLUTION INTRAVENOUS at 21:58

## 2021-06-11 NOTE — PROGRESS NOTE ADULT - SUBJECTIVE AND OBJECTIVE BOX
HPI: 45yoF with PMHx recurrent inverted papilloma of right maxilla s/p resection x2 now s/p 3rd right medial maxillectomy.  Intraoperative course uneventful.  Pt was transferred to PACU without issue.  Denies any significant pain or nausea at this time.  Sleepy from anesthesia.      PAST MEDICAL & SURGICAL HISTORY:  Nasal polyp  Sickle cell trait  Neoplasm maxillary sinus  S/P sinus surgery, x 2  H/O nasal polypectomy      Allergies  No Known Allergies      Vital Signs Last 24 Hrs  T(C): 36.5 (11 Jun 2021 15:57), Max: 36.5 (11 Jun 2021 15:57)  T(F): 97.7 (11 Jun 2021 15:57), Max: 97.7 (11 Jun 2021 15:57)  HR: 64 (11 Jun 2021 16:07) (64 - 77)  BP: 149/85 (11 Jun 2021 16:07) (149/81 - 151/81)  BP(mean): 110 (11 Jun 2021 16:07) (109 - 111)  RR: 13 (11 Jun 2021 16:07) (12 - 14)  SpO2: 99% (11 Jun 2021 16:07) (96% - 99%)      Physical Exam:  General: NAD, sleepy from anesthesia  Head: NCAT, facial movement grossly symmetric  Eyes:  Nose HPI: 45yoF with PMHx recurrent inverted papilloma of right maxilla s/p resection x2 now s/p 3rd right medial maxillectomy.  Intraoperative course uneventful.  Pt was transferred to PACU without issue.  Denies any significant pain or nausea at this time.  No blurry vision  Sleepy from anesthesia.      PAST MEDICAL & SURGICAL HISTORY:  Nasal polyp  Sickle cell trait  Neoplasm maxillary sinus  S/P sinus surgery, x 2  H/O nasal polypectomy      Allergies  No Known Allergies      Vital Signs Last 24 Hrs  T(C): 36.5 (11 Jun 2021 15:57), Max: 36.5 (11 Jun 2021 15:57)  T(F): 97.7 (11 Jun 2021 15:57), Max: 97.7 (11 Jun 2021 15:57)  HR: 64 (11 Jun 2021 16:07) (64 - 77)  BP: 149/85 (11 Jun 2021 16:07) (149/81 - 151/81)  BP(mean): 110 (11 Jun 2021 16:07) (109 - 111)  RR: 13 (11 Jun 2021 16:07) (12 - 14)  SpO2: 99% (11 Jun 2021 16:07) (96% - 99%)      Physical Exam:  General: NAD, sleepy from anesthesia  Head: NCAT, facial movement grossly symmetric  Eyes: EOMI***  Nose: merocel in R nares, no evidence of bleeding  OC/OP: MMM, tongue midline, OP clear  Neck: soft, supple, good ROM  Respiratory: NLB on RA HPI: 45yoF with PMHx recurrent inverted papilloma of right maxilla s/p resection x2 now s/p 3rd right medial maxillectomy.  Intraoperative course uneventful.  Pt was transferred to PACU without issue.  Denies any significant pain or nausea at this time.  No blurry vision.    PAST MEDICAL & SURGICAL HISTORY:  Nasal polyp  Sickle cell trait  Neoplasm maxillary sinus  S/P sinus surgery, x 2  H/O nasal polypectomy      Allergies  No Known Allergies      Vital Signs Last 24 Hrs  T(C): 36.5 (11 Jun 2021 15:57), Max: 36.5 (11 Jun 2021 15:57)  T(F): 97.7 (11 Jun 2021 15:57), Max: 97.7 (11 Jun 2021 15:57)  HR: 64 (11 Jun 2021 16:07) (64 - 77)  BP: 149/85 (11 Jun 2021 16:07) (149/81 - 151/81)  BP(mean): 110 (11 Jun 2021 16:07) (109 - 111)  RR: 13 (11 Jun 2021 16:07) (12 - 14)  SpO2: 99% (11 Jun 2021 16:07) (96% - 99%)      Physical Exam:  General: NAD, sleepy from anesthesia  Head: NCAT, facial movement grossly symmetric  Eyes: EOMI  Nose: merocel in R nares, no evidence of bleeding  OC/OP: MMM, tongue midline, OP clear  Neck: soft, supple, good ROM  Respiratory: NLB on RA

## 2021-06-11 NOTE — PROGRESS NOTE ADULT - ASSESSMENT
45yoF with PMHx recurrent inverted papilloma of right maxilla s/p resection x2 now s/p 3rd right medial maxillectomy.  - Admitted for 23h observation  - Merocel in place to be removed tomorrow  - Ancef while merocel in place  - Pain control  - Home medications  - Regular diet  - Ambulate as tolerated  - SCDs while in bed  - Please page ENT with any questions or concerns   45yoF with PMHx recurrent inverted papilloma of right maxilla s/p resection x2 now s/p 3rd right medial maxillectomy.  - Admitted for 23h observation  - Merocel in place to be removed tomorrow  - Ancef while merocel in place  - Pain control  - No home medications  - Regular diet  - Ambulate as tolerated  - SCDs while in bed  - Please page ENT with any questions or concerns

## 2021-06-12 VITALS
OXYGEN SATURATION: 96 % | RESPIRATION RATE: 18 BRPM | DIASTOLIC BLOOD PRESSURE: 77 MMHG | SYSTOLIC BLOOD PRESSURE: 111 MMHG | HEART RATE: 87 BPM | TEMPERATURE: 98 F

## 2021-06-12 PROCEDURE — 88311 DECALCIFY TISSUE: CPT

## 2021-06-12 PROCEDURE — 31225 REMOVAL OF UPPER JAW: CPT

## 2021-06-12 PROCEDURE — 88305 TISSUE EXAM BY PATHOLOGIST: CPT

## 2021-06-12 RX ADMIN — Medication 100 MILLIGRAM(S): at 05:13

## 2021-06-14 PROBLEM — D49.9 NEOPLASM OF UNSPECIFIED BEHAVIOR OF UNSPECIFIED SITE: Chronic | Status: ACTIVE | Noted: 2021-06-10

## 2021-06-15 LAB — SURGICAL PATHOLOGY STUDY: SIGNIFICANT CHANGE UP

## 2021-06-28 ENCOUNTER — APPOINTMENT (OUTPATIENT)
Dept: OTOLARYNGOLOGY | Facility: CLINIC | Age: 46
End: 2021-06-28
Payer: COMMERCIAL

## 2021-06-28 PROCEDURE — 31237 NSL/SINS NDSC SURG BX POLYPC: CPT | Mod: 79,RT

## 2021-07-25 ENCOUNTER — TRANSCRIPTION ENCOUNTER (OUTPATIENT)
Age: 46
End: 2021-07-25

## 2021-12-14 ENCOUNTER — APPOINTMENT (OUTPATIENT)
Dept: OTOLARYNGOLOGY | Facility: CLINIC | Age: 46
End: 2021-12-14
Payer: COMMERCIAL

## 2021-12-14 DIAGNOSIS — J32.1 CHRONIC FRONTAL SINUSITIS: ICD-10-CM

## 2021-12-14 DIAGNOSIS — J32.3 CHRONIC SPHENOIDAL SINUSITIS: ICD-10-CM

## 2021-12-14 PROCEDURE — 99214 OFFICE O/P EST MOD 30 MIN: CPT | Mod: 25

## 2021-12-14 PROCEDURE — 31231 NASAL ENDOSCOPY DX: CPT

## 2022-06-14 ENCOUNTER — APPOINTMENT (OUTPATIENT)
Dept: OTOLARYNGOLOGY | Facility: CLINIC | Age: 47
End: 2022-06-14

## 2022-06-30 ENCOUNTER — APPOINTMENT (OUTPATIENT)
Dept: OTOLARYNGOLOGY | Facility: CLINIC | Age: 47
End: 2022-06-30

## 2022-06-30 VITALS
HEART RATE: 80 BPM | HEIGHT: 68 IN | DIASTOLIC BLOOD PRESSURE: 69 MMHG | WEIGHT: 276 LBS | BODY MASS INDEX: 41.83 KG/M2 | SYSTOLIC BLOOD PRESSURE: 120 MMHG | TEMPERATURE: 97.8 F

## 2022-06-30 DIAGNOSIS — C31.0 MALIGNANT NEOPLASM OF MAXILLARY SINUS: ICD-10-CM

## 2022-06-30 DIAGNOSIS — J01.20 ACUTE ETHMOIDAL SINUSITIS, UNSPECIFIED: ICD-10-CM

## 2022-06-30 PROCEDURE — 31231 NASAL ENDOSCOPY DX: CPT

## 2022-06-30 PROCEDURE — 99212 OFFICE O/P EST SF 10 MIN: CPT | Mod: 25

## 2022-12-01 NOTE — DISCHARGE NOTE ADULT - PROCEDURE 1
POSTOPERATIVE INSTRUCTIONS     1. Keep initial dressing dry and in place for 24 hours.     2. After 24 hours, carefully remove dressing and follow wound   care instructions once a day until sutures are removed.     3. Gently cleanse wound with a mild cleanser and running water.  Ideally this is done in the shower.  Allow water to run over the area, but avoid direct pressure.  This is important because CLEANSING WITH RUNNING WATER IS THE BEST WAY TO PREVENT INFECTION!  Gently dry wound with clean gauze     4. Apply a thin layer of Vaseline or Polysporin ointment to the   wound. (Keeping wounds moist with ointment has shown to   improve the appearance of the wound and speed healing)     5. Cover the wound with a Band-Aid or gauze and paper tape.   You may leave the wound uncovered at night     What to avoid:   ? Strenuous activity that places stress on your wound.   Avoid heavy lifting or bending     ? Swimming, hot tubs or tub baths.     ? For the first 24 hours after surgery, take care to keep the   dressing dry. After the initial dressing has been removed,   you may shower. Avoid direct shower spray on the wound     ? Avoid shaving over the wound while sutures are in place.   you may shave around it     ? If sutures are on the scalp, use caution while combing hair     ? Try to discontinue or minimize smoking as this may interfere   with wound healing     Swelling:   ? Sleep on an extra pillow to minimize swelling for surgeries on   the face and scalp     ? Apply ice to the wound site every 2 hours for 15 minutes on   the day of surgery if instructed by your physician (A frozen   bag of peas or corn works well as an ice pack)     Pain:   ? Take acetaminophen (Tylenol) for pain as needed   ? Avoid aspirin, ibuprofen and other non-steroidal anti-   inflammatory medications as they may increase your risk   for bleeding. If you are taking a daily aspirin or prescribed   blood thinner, these should NOT be discontinued      Bleeding:   ? If you notice bleeding through the dressing, apply continuous,   firm pressure directly to the wound with a clean towel   ? If bleeding persists, apply pressure for an additional 15 minutes   ? If bleeding does not stop after the above measures are taken,   call our office     When to contact us and /or seek medical attention:   ? Redness that extends beyond the wound edges   ? Drainage   ? Marked swelling   ? Fever or chills   ? Bleeding that persists after the above measures are taken   ? Extreme pain     Return in 10-14 days for suture removal   *Remember that even after suture removal, you are   still healing, so be gentle!*     Please call our office with any concerns     Seek immediate medical attention for medical emergencies    Medial maxillectomy

## 2022-12-14 ENCOUNTER — NON-APPOINTMENT (OUTPATIENT)
Age: 47
End: 2022-12-14

## 2022-12-19 ENCOUNTER — APPOINTMENT (OUTPATIENT)
Dept: OTOLARYNGOLOGY | Facility: CLINIC | Age: 47
End: 2022-12-19

## 2022-12-19 VITALS
WEIGHT: 225.2 LBS | HEART RATE: 70 BPM | DIASTOLIC BLOOD PRESSURE: 80 MMHG | SYSTOLIC BLOOD PRESSURE: 127 MMHG | TEMPERATURE: 97.4 F | BODY MASS INDEX: 34.13 KG/M2 | HEIGHT: 68 IN

## 2022-12-19 DIAGNOSIS — J32.0 CHRONIC MAXILLARY SINUSITIS: ICD-10-CM

## 2022-12-19 DIAGNOSIS — J32.2 CHRONIC ETHMOIDAL SINUSITIS: ICD-10-CM

## 2022-12-19 DIAGNOSIS — J33.0 POLYP OF NASAL CAVITY: ICD-10-CM

## 2022-12-19 DIAGNOSIS — D14.0 BENIGN NEOPLASM OF MIDDLE EAR, NASAL CAVITY AND ACCESSORY SINUSES: ICD-10-CM

## 2022-12-19 PROCEDURE — 99214 OFFICE O/P EST MOD 30 MIN: CPT | Mod: 25

## 2022-12-19 PROCEDURE — 31231 NASAL ENDOSCOPY DX: CPT

## 2023-09-06 ENCOUNTER — NON-APPOINTMENT (OUTPATIENT)
Age: 48
End: 2023-09-06

## 2023-09-06 ENCOUNTER — APPOINTMENT (OUTPATIENT)
Dept: OTOLARYNGOLOGY | Facility: CLINIC | Age: 48
End: 2023-09-06
Payer: COMMERCIAL

## 2023-09-06 PROCEDURE — 31231 NASAL ENDOSCOPY DX: CPT

## 2023-09-06 PROCEDURE — 99214 OFFICE O/P EST MOD 30 MIN: CPT | Mod: 25

## 2024-05-13 ENCOUNTER — NON-APPOINTMENT (OUTPATIENT)
Age: 49
End: 2024-05-13

## 2024-05-14 ENCOUNTER — APPOINTMENT (OUTPATIENT)
Dept: OTOLARYNGOLOGY | Facility: CLINIC | Age: 49
End: 2024-05-14
Payer: COMMERCIAL

## 2024-05-14 PROCEDURE — 99214 OFFICE O/P EST MOD 30 MIN: CPT | Mod: 25

## 2024-05-14 PROCEDURE — 31231 NASAL ENDOSCOPY DX: CPT

## 2025-03-03 ENCOUNTER — NON-APPOINTMENT (OUTPATIENT)
Age: 50
End: 2025-03-03

## 2025-03-03 ENCOUNTER — APPOINTMENT (OUTPATIENT)
Dept: OTOLARYNGOLOGY | Facility: CLINIC | Age: 50
End: 2025-03-03

## 2025-03-03 VITALS
TEMPERATURE: 98.6 F | HEIGHT: 68 IN | WEIGHT: 237 LBS | SYSTOLIC BLOOD PRESSURE: 128 MMHG | DIASTOLIC BLOOD PRESSURE: 90 MMHG | BODY MASS INDEX: 35.92 KG/M2 | HEART RATE: 72 BPM

## 2025-03-03 PROCEDURE — 99214 OFFICE O/P EST MOD 30 MIN: CPT | Mod: 25

## 2025-03-03 PROCEDURE — 31231 NASAL ENDOSCOPY DX: CPT
